# Patient Record
Sex: FEMALE | Race: BLACK OR AFRICAN AMERICAN | NOT HISPANIC OR LATINO | URBAN - METROPOLITAN AREA
[De-identification: names, ages, dates, MRNs, and addresses within clinical notes are randomized per-mention and may not be internally consistent; named-entity substitution may affect disease eponyms.]

---

## 2018-01-18 ENCOUNTER — OFFICE VISIT (OUTPATIENT)
Dept: URGENT CARE | Facility: CLINIC | Age: 43
End: 2018-01-18
Payer: COMMERCIAL

## 2018-01-18 ENCOUNTER — APPOINTMENT (OUTPATIENT)
Dept: LAB | Facility: HOSPITAL | Age: 43
End: 2018-01-18
Attending: FAMILY MEDICINE
Payer: COMMERCIAL

## 2018-01-18 DIAGNOSIS — R50.9 FEVER: ICD-10-CM

## 2018-01-18 LAB — HCG, QUALITATIVE (HISTORICAL): NEGATIVE

## 2018-01-18 PROCEDURE — 87798 DETECT AGENT NOS DNA AMP: CPT

## 2018-01-18 PROCEDURE — 81025 URINE PREGNANCY TEST: CPT

## 2018-01-18 PROCEDURE — G0382 LEV 3 HOSP TYPE B ED VISIT: HCPCS

## 2018-01-19 LAB
FLUAV AG SPEC QL: DETECTED
FLUBV AG SPEC QL: ABNORMAL
RSV B RNA SPEC QL NAA+PROBE: ABNORMAL

## 2018-01-19 NOTE — PROGRESS NOTES
Assessment   1  Flu-like symptoms (780 99) (R67 10)1      1 Amended By: Fredrick Gonzalez; Jan 18 2018 2:45 PM EST      Plan   Fever    · (1) INFLUENZA A/B AND RSV, PCR, > 2 MOS AGE; Status:Active - Retrospective By    Protocol Authorization; Requested DBQ:[id number]; Flu-like symptoms    · Start: Oseltamivir Phosphate 75 MG Oral Capsule (Tamiflu); TAKE 1 CAPSULE TWICE    DAILY WITH MEALS    Discussion/Summary   Discussion Summary: We are treating this as possible Flu  Inc fluids and use medications as written Motrin or Tylenol as needed for pain  Chief Complaint   1  Cold Symptoms  Chief Complaint Free Text Note Form: Pt reports yesterday she developed a cough, sore throat, body aches and chills  History of Present Illness   HPI: Pt presents with a cough, sore throat, fever and body aches and pains which began yesterday  The body aches and pains are intense  She has missed a period but her HCG was negative  Hospital Based Practices Required Assessment:      Pain Assessment      the patient states they do not have pain  Abuse And Domestic Violence Screen       Yes, the patient is safe at home  -- The patient states no one is hurting them  Depression And Suicide Screen  No, the patient has not had thoughts of hurting themself  No, the patient has not felt depressed in the past 7 days  Prefered Language is  [place]  Primary Language is  English  Past Medical History   1  History of hypothyroidism (V12 29) (Z86 39)    Social History    · Never a smoker    Surgical History   1  History of Tubal Ligation    Current Meds   1  Synthroid 50 MCG Oral Tablet; Therapy: (Recorded:[id number]) to Recorded    Allergies   1   No Known Drug Allergies    Vitals   Signs   Recorded: [id number] 01:02PM   Temperature: 101 9 F  Heart Rate: 112  Respiration: 18  Systolic: 272  Diastolic: 70  Height: 5 ft 4 in  Weight: 178 lb   BMI Calculated: 30 55  BSA Calculated: 1 86  O2 Saturation: 98  Recorded: [id number] 01:01PM   LMP: [id number]    Physical Exam        Constitutional      General appearance: No acute distress, well appearing and well nourished  Eyes      Conjunctiva and lids: No swelling, erythema or discharge  Pupils and irises: Equal, round and reactive to light  Ears, Nose, Mouth, and Throat      External inspection of ears and nose: Normal        Pulmonary      Respiratory effort: No increased work of breathing or signs of respiratory distress  Auscultation of lungs: Clear to auscultation  Cardiovascular      Auscultation of heart: Normal rate and rhythm, normal S1 and S2, without murmurs  Musculoskeletal      Gait and station: Normal        Digits and nails: Normal without clubbing or cyanosis  Inspection/palpation of joints, bones, and muscles: Normal        Skin      Skin and subcutaneous tissue: Normal without rashes or lesions  Additional Exam:  She was lying on the exam table when I entered the room  Results/Data   Urine HCG- POC [id number] 01:28PM Maddi Noble      Test Name Result Flag Reference   Urine HCG Negative          Message   Return to work or school:    Kaleb Colindres is under my professional care  She was seen in my office on 1/18/18    She is able to return to work on  in 2 - 3 days               Signatures    Electronically signed by : Audrey ePrez MD; Jan 18 2018  2:44PM EST                       (Author)     Electronically signed by : Audrey Perez MD; Jan 18 2018  2:45PM EST                       (Author)

## 2018-01-23 VITALS
RESPIRATION RATE: 18 BRPM | HEART RATE: 112 BPM | BODY MASS INDEX: 30.39 KG/M2 | DIASTOLIC BLOOD PRESSURE: 70 MMHG | TEMPERATURE: 101.9 F | OXYGEN SATURATION: 98 % | SYSTOLIC BLOOD PRESSURE: 114 MMHG | HEIGHT: 64 IN | WEIGHT: 178 LBS

## 2018-01-24 NOTE — MISCELLANEOUS
Message  Return to work or school:   Loc Elliott is under my professional care  She was seen in my office on 1/18/18   She is able to return to work on  in 2 - 3 days              Signatures   Electronically signed by : Kelli Shane MD; Jan 18 2018  2:44PM EST                       (Author)    Electronically signed by : Kelli Shane MD; Jan 18 2018  2:45PM EST                       (Author)

## 2020-08-25 ENCOUNTER — OFFICE VISIT (OUTPATIENT)
Dept: URGENT CARE | Facility: CLINIC | Age: 45
End: 2020-08-25
Payer: COMMERCIAL

## 2020-08-25 DIAGNOSIS — Z11.59 SPECIAL SCREENING EXAMINATION FOR UNSPECIFIED VIRAL DISEASE: Primary | ICD-10-CM

## 2020-08-25 PROCEDURE — U0003 INFECTIOUS AGENT DETECTION BY NUCLEIC ACID (DNA OR RNA); SEVERE ACUTE RESPIRATORY SYNDROME CORONAVIRUS 2 (SARS-COV-2) (CORONAVIRUS DISEASE [COVID-19]), AMPLIFIED PROBE TECHNIQUE, MAKING USE OF HIGH THROUGHPUT TECHNOLOGIES AS DESCRIBED BY CMS-2020-01-R: HCPCS

## 2020-08-25 PROCEDURE — G0382 LEV 3 HOSP TYPE B ED VISIT: HCPCS

## 2020-08-25 NOTE — PROGRESS NOTES
Assessment/Plan:      Diagnoses and all orders for this visit:    Special screening examination for unspecified viral disease  -     Novel Coronavirus (COVID-19), PCR [facility] - Office Collection          Subjective:     Patient ID: Hilda Flynn is a 41 y o  female  She in the rest of her family a here for COVID testing  They are visiting family in [place]  The entire family is asymptomatic        Review of Systems      Objective:     Physical Exam

## 2020-08-28 LAB — SARS-COV-2 RNA SPEC QL NAA+PROBE: NOT DETECTED
